# Patient Record
Sex: FEMALE | Race: ASIAN | NOT HISPANIC OR LATINO | Employment: UNEMPLOYED | URBAN - METROPOLITAN AREA
[De-identification: names, ages, dates, MRNs, and addresses within clinical notes are randomized per-mention and may not be internally consistent; named-entity substitution may affect disease eponyms.]

---

## 2017-02-13 ENCOUNTER — APPOINTMENT (OUTPATIENT)
Dept: RADIOLOGY | Facility: MEDICAL CENTER | Age: 27
End: 2017-02-13
Attending: EMERGENCY MEDICINE
Payer: COMMERCIAL

## 2017-02-13 ENCOUNTER — HOSPITAL ENCOUNTER (EMERGENCY)
Facility: MEDICAL CENTER | Age: 27
End: 2017-02-13
Attending: EMERGENCY MEDICINE
Payer: COMMERCIAL

## 2017-02-13 VITALS
TEMPERATURE: 98.9 F | DIASTOLIC BLOOD PRESSURE: 82 MMHG | SYSTOLIC BLOOD PRESSURE: 116 MMHG | BODY MASS INDEX: 18.86 KG/M2 | WEIGHT: 110.45 LBS | HEIGHT: 64 IN | OXYGEN SATURATION: 97 % | RESPIRATION RATE: 14 BRPM | HEART RATE: 80 BPM

## 2017-02-13 DIAGNOSIS — S93.504A: ICD-10-CM

## 2017-02-13 PROCEDURE — 73630 X-RAY EXAM OF FOOT: CPT | Mod: RT

## 2017-02-13 PROCEDURE — 700102 HCHG RX REV CODE 250 W/ 637 OVERRIDE(OP): Performed by: EMERGENCY MEDICINE

## 2017-02-13 PROCEDURE — A9270 NON-COVERED ITEM OR SERVICE: HCPCS | Performed by: EMERGENCY MEDICINE

## 2017-02-13 PROCEDURE — 99284 EMERGENCY DEPT VISIT MOD MDM: CPT

## 2017-02-13 RX ORDER — HYDROCODONE BITARTRATE AND ACETAMINOPHEN 5; 325 MG/1; MG/1
1-2 TABLET ORAL EVERY 4 HOURS PRN
Qty: 10 TAB | Refills: 0 | Status: SHIPPED | OUTPATIENT
Start: 2017-02-13

## 2017-02-13 RX ORDER — HYDROCODONE BITARTRATE AND ACETAMINOPHEN 5; 325 MG/1; MG/1
2 TABLET ORAL ONCE
Status: COMPLETED | OUTPATIENT
Start: 2017-02-13 | End: 2017-02-13

## 2017-02-13 RX ADMIN — HYDROCODONE BITARTRATE AND ACETAMINOPHEN 2 TABLET: 5; 325 TABLET ORAL at 12:57

## 2017-02-13 ASSESSMENT — LIFESTYLE VARIABLES
TOTAL SCORE: 0
TOTAL SCORE: 0
CONSUMPTION TOTAL: NEGATIVE
ON A TYPICAL DAY WHEN YOU DRINK ALCOHOL HOW MANY DRINKS DO YOU HAVE: 1
DO YOU DRINK ALCOHOL: YES
HAVE PEOPLE ANNOYED YOU BY CRITICIZING YOUR DRINKING: NO
EVER HAD A DRINK FIRST THING IN THE MORNING TO STEADY YOUR NERVES TO GET RID OF A HANGOVER: NO
HAVE YOU EVER FELT YOU SHOULD CUT DOWN ON YOUR DRINKING: NO
HOW MANY TIMES IN THE PAST YEAR HAVE YOU HAD 5 OR MORE DRINKS IN A DAY: 0
EVER FELT BAD OR GUILTY ABOUT YOUR DRINKING: NO
TOTAL SCORE: 0
AVERAGE NUMBER OF DAYS PER WEEK YOU HAVE A DRINK CONTAINING ALCOHOL: 1

## 2017-02-13 ASSESSMENT — PAIN SCALES - GENERAL
PAINLEVEL_OUTOF10: 2
PAINLEVEL_OUTOF10: 9

## 2017-02-13 NOTE — ED PROVIDER NOTES
"ED Provider Note    CHIEF COMPLAINT   Chief Complaint   Patient presents with   • Toe Injury     pt reports she broke her 4th digit on her R foot approx 2-3 days ago, \"something to do with the rain.  I think I fell really weird\".        HPI   Alondra Ramirez is a 26 y.o. female who presents for right 4th toe pain with bruising of the 3rd and 4th toes and the foot. Patient fell 2 days ago. Pain severe and she has difficulty walking. Denies pregnancy    REVIEW OF SYSTEMS   Pertinent positives: Small wound, bruising.  Pertinent negatives: Occipital leg pain.    PAST MEDICAL HISTORY   History reviewed. No pertinent past medical history.    CURRENT MEDICATIONS   Denies    ALLERGIES   No Known Allergies    PHYSICAL EXAM  VITAL SIGNS: /71 mmHg  Pulse 87  Temp(Src) 37.2 °C (98.9 °F) (Temporal)  Resp 18  Ht 1.626 m (5' 4\")  Wt 50.1 kg (110 lb 7.2 oz)  BMI 18.95 kg/m2  SpO2 96%  LMP 01/25/2017 (Approximate)  Constitutional: Well developed, Well nourished, borderline blood pressure elevation, well-appearing.  HENT: Atraumatic.   Cardiovascular: Foot warm.  Skin: Bruising of the 4th and 3rd toes of the right foot with ecchymoses extending onto the distal foot.   Musculoskeletal: Tenderness primarily of the 4th toe without deformity  Compartments: Soft foot  Neurologic: Digit flexion-extension and sensation intact    RADIOLOGY/PROCEDURES  DX-FOOT-COMPLETE 3+ RIGHT   Final Result      No evidence of acute fracture or dislocation.        Medications   hydrocodone-acetaminophen (NORCO) 5-325 MG per tablet 2 Tab (2 Tabs Oral Given 2/13/17 1257)     Improvement in pain    COURSE & MEDICAL DECISION MAKING  Well-appearing patient presents with a right 4th toe sprain without evidence of fracture or dislocation. Dislocation and spontaneous reduction is possible.    PLAN:  Postop shoe  Ibuprofen  Hydrocodone 5 mg #10  Prescription monitoring access    CONDITION: good    FINAL IMPRESSION  1. Sprain of toe, fourth, right, initial " encounter            Electronically signed by: Charlie Morse, 2/13/2017 12:45 PM

## 2017-02-13 NOTE — ED AVS SNAPSHOT
Home Care Instructions                                                                                                                Alondra Ramirez   MRN: [id number]    Department:  [facility] [place], Emergency Dept   Date of Visit:  2/13/2017            [facility], Emergency Dept    [address]    [place] [address]    Phone:  [phone]      You were seen by     Charlie Morse M.D.      Your Diagnosis Was     Sprain of toe, fourth, right, initial encounter     S93.504A       These are the medications you received during your hospitalization from 02/13/2017 1144 to 02/13/2017 1446     Date/Time Order Dose Route Action    02/13/2017 1257 hydrocodone-acetaminophen (NORCO) 5-325 MG per tablet 2 Tab 2 Tab Oral Given      Follow-up Information     1. Schedule an appointment as soon as possible for a visit with [facility].    Why:  As needed    Contact information    [address]  [place] [id number] [phone]        Medication Information     Review all of your home medications and newly ordered medications with your primary doctor and/or pharmacist as soon as possible. Follow medication instructions as directed by your doctor and/or pharmacist.     Please keep your complete medication list with you and share with your physician. Update the information when medications are discontinued, doses are changed, or new medications (including over-the-counter products) are added; and carry medication information at all times in the event of emergency situations.               Medication List      START taking these medications        Instructions    hydrocodone-acetaminophen 5-325 MG Tabs per tablet   Commonly known as:  NORCO    Take 1-2 Tabs by mouth every four hours as needed (mild pain).   Dose:  1-2 Tab               Procedures and tests performed during your visit     DX-FOOT-COMPLETE 3+ RIGHT        Discharge Instructions       Sprain  Ice the toe. Take ibuprofen 500 mg 4 times a  day and hydrocodone as needed for pain. Wear a stiff shoe.    A sprain is a tear in one of the strong, fibrous tissues that connect your bones (ligaments). The severity of the sprain depends on how much of the ligament is torn. The tear can be either partial or complete.  CAUSES   Often, sprains are a result of a fall or an injury. The force of the impact causes the fibers of your ligament to stretch beyond their normal length. This excess tension causes the fibers of your ligament to tear.  SYMPTOMS   You may have some loss of motion or increased pain within your normal range of motion. Other symptoms include:  · Bruising.  · Tenderness.  · Swelling.  DIAGNOSIS   In order to diagnose a sprain, your caregiver will physically examine you to determine how torn the ligament is. Your caregiver may also suggest an X-ray exam to make sure no bones are broken.  TREATMENT   If your ligament is only partially torn, treatment usually involves keeping the injured area in a fixed position (immobilization) for a short period. To do this, your caregiver will apply a bandage, cast, or splint to keep the area from moving until it heals. For a partially torn ligament, the healing process usually takes 2 to 3 weeks.  If your ligament is completely torn, you may need surgery to reconnect the ligament to the bone or to reconstruct the ligament. After surgery, a cast or splint may be applied and will need to stay on for 4 to 6 weeks while your ligament heals.  HOME CARE INSTRUCTIONS  · Keep the injured area elevated to decrease swelling.  · To ease pain and swelling, apply ice to your joint twice a day, for 2 to 3 days.  · Put ice in a plastic bag.  · Place a towel between your skin and the bag.  · Leave the ice on for 15 minutes.  · Only take over-the-counter or prescription medicine for pain as directed by your caregiver.  · Do not leave the injured area unprotected until pain and stiffness go away (usually 3 to 4 weeks).  · Do not  allow your cast or splint to get wet. Cover your cast or splint with a plastic bag when you shower or bathe. Do not swim.  · Your caregiver may suggest exercises for you to do during your recovery to prevent or limit permanent stiffness.  SEEK IMMEDIATE MEDICAL CARE IF:  · Your cast or splint becomes damaged.  · Your pain becomes worse.  MAKE SURE YOU:  · Understand these instructions.  · Will watch your condition.  · Will get help right away if you are not doing well or get worse.  Document Released: 12/15/2001 Document Revised: 03/11/2013 Document Reviewed: 12/29/2012  [employer]Care® Patient Information ©2014 [employer].            Patient Information     Patient Information    Following emergency treatment: all patient requiring follow-up care must return either to a private physician or a clinic if your condition worsens before you are able to obtain further medical attention, please return to the emergency room.     Billing Information    At [facility], we work to make the billing process streamlined for our patients.  Our Representatives are here to answer any questions you may have regarding your hospital bill.  If you have insurance coverage and have supplied your insurance information to us, we will submit a claim to your insurer on your behalf.  Should you have any questions regarding your bill, we can be reached online or by phone as follows:  Online: You are able pay your bills online or live chat with our representatives about any billing questions you may have. We are here to help Monday - Friday from 8:00am to 7:30pm and 9:00am - 12:00pm on Saturdays.  Please visit [email or web address]  for more information.   Phone:  [phone] or [phone]    Please note that your emergency physician, surgeon, pathologist, radiologist, anesthesiologist, and other specialists are not employed by [facility] and will therefore bill separately for their services.  Please contact them  directly for any questions concerning their bills at the numbers below:     Emergency Physician Services:  [phone]  [facility]:  [phone]  [facility]:  [phone]  [facility]:  [phone]    1. Your final bill may vary from the amount quoted upon discharge if all procedures are not complete at that time, or if your doctor has additional procedures of which we are not aware. You will receive an additional bill if you return to the Emergency Department at [facility] for suture removal regardless of the facility of which the sutures were placed.     2. Please arrange for settlement of this account at the emergency registration.    3. All self-pay accounts are due in full at the time of treatment.  If you are unable to meet this obligation then payment is expected within 4-5 days.     4. If you have had radiology studies (CT, X-ray, Ultrasound, MRI), you have received a preliminary result during your emergency department visit. Please contact the radiology department [phone] to receive a copy of your final result. Please discuss the Final result with your primary physician or with the follow up physician provided.     Crisis Hotline:  [place] Crisis Hotline:  2-357-ISUJLZB or 1-465.139.2034  [place] Crisis Hotline:    1-756.429.7263 or 112-889-6053         ED Discharge Follow Up Questions    1. In order to provide you with very good care, we would like to follow up with a phone call in the next few days.  May we have your permission to contact you?     YES /  NO    2. What is the best phone number to call you? (       )_____-__________    3. What is the best time to call you?      Morning  /  Afternoon  /  Evening                   Patient Signature:  ____________________________________________________________    Date:  ____________________________________________________________

## 2017-02-13 NOTE — ED AVS SNAPSHOT
besomebody. Access Code: [email or web address]  Expires: 3/15/2017  2:46 PM    Your email address is not on file at [employer]  Email Addresses are required for you to sign up for besomebody., please contact [phone] to verify your personal information and to provide your email address prior to attempting to register for besomebody..    [place] [address]    [employer]  A secure, online tool to manage your health information     [employer]s besomebody.® is a secure, online tool that connects you to your personalized health information from the privacy of your home -- day or night - making it very easy for you to manage your healthcare. Once the activation process is completed, you can even access your medical information using the besomebody. galo, which is available for free in the Apple Galo store or Google Play store.     To learn more about besomebody., visit [email or web address].    There are two levels of access available (as shown below):   My Chart Features  [facility] Primary Care Doctor [facility]  Specialists [facility]  Urgent  Care Non-[facility] Primary Care Doctor   Email your healthcare team securely and privately 24/7 X X X    Manage appointments: schedule your next appointment; view details of past/upcoming appointments X      Request prescription refills. X      View recent personal medical records, including lab and immunizations X X X X   View health record, including health history, allergies, medications X X X X   Read reports about your outpatient visits, procedures, consult and ER notes X X X X   See your discharge summary, which is a recap of your hospital and/or ER visit that includes your diagnosis, lab results, and care plan X X  X     How to register for besomebody.:  Once your e-mail address has been verified, follow the following steps to sign up for besomebody..     1. Go to  [email or web address]  2. Click on the Sign Up Now box, which takes you to the New Member Sign Up page. You will need to  provide the following information:  a. Enter your [employer] Access Code exactly as it appears at the top of this page. (You will not need to use this code after you’ve completed the sign-up process. If you do not sign up before the expiration date, you must request a new code.)   b. Enter your date of birth.   c. Enter your home email address.   d. Click Submit, and follow the next screen’s instructions.  3. Create a Yapta ID. This will be your Yapta login ID and cannot be changed, so think of one that is secure and easy to remember.  4. Create a Yapta password. You can change your password at any time.  5. Enter your Password Reset Question and Answer. This can be used at a later time if you forget your password.   6. Enter your e-mail address. This allows you to receive e-mail notifications when new information is available in Yapta.  7. Click Sign Up. You can now view your health information.    For assistance activating your [employer] account, call [phone]

## 2017-02-13 NOTE — ED NOTES
Pt given d/c instructions/home care instructions, pt verbalized understanding of POC, pt ambulated to ER lobby, steady gait.

## 2017-02-13 NOTE — ED AVS SNAPSHOT
2/13/2017          Alondra Ramirez  [address] [place] [address]    Dear Alondra:    [facility] wants to ensure your discharge home is safe and you or your loved ones have had all your questions answered regarding your care after you leave the hospital.    You may receive a telephone call within two days of your discharge.  This call is to make certain you understand your discharge instructions as well as ensure we provided you with the best care possible during your stay with us.     The call will only last approximately 3-5 minutes and will be done by a nurse.    Once again, we want to ensure your discharge home is safe and that you have a clear understanding of any next steps in your care.  If you have any questions or concerns, please do not hesitate to contact us, we are here for you.  Thank you for choosing [facility] for your healthcare needs.    Sincerely,    Abdiel Mari    [facility]

## 2017-02-13 NOTE — DISCHARGE INSTRUCTIONS
Sprain  Ice the toe. Take ibuprofen 500 mg 4 times a day and hydrocodone as needed for pain. Wear a stiff shoe.    A sprain is a tear in one of the strong, fibrous tissues that connect your bones (ligaments). The severity of the sprain depends on how much of the ligament is torn. The tear can be either partial or complete.  CAUSES   Often, sprains are a result of a fall or an injury. The force of the impact causes the fibers of your ligament to stretch beyond their normal length. This excess tension causes the fibers of your ligament to tear.  SYMPTOMS   You may have some loss of motion or increased pain within your normal range of motion. Other symptoms include:  · Bruising.  · Tenderness.  · Swelling.  DIAGNOSIS   In order to diagnose a sprain, your caregiver will physically examine you to determine how torn the ligament is. Your caregiver may also suggest an X-ray exam to make sure no bones are broken.  TREATMENT   If your ligament is only partially torn, treatment usually involves keeping the injured area in a fixed position (immobilization) for a short period. To do this, your caregiver will apply a bandage, cast, or splint to keep the area from moving until it heals. For a partially torn ligament, the healing process usually takes 2 to 3 weeks.  If your ligament is completely torn, you may need surgery to reconnect the ligament to the bone or to reconstruct the ligament. After surgery, a cast or splint may be applied and will need to stay on for 4 to 6 weeks while your ligament heals.  HOME CARE INSTRUCTIONS  · Keep the injured area elevated to decrease swelling.  · To ease pain and swelling, apply ice to your joint twice a day, for 2 to 3 days.  · Put ice in a plastic bag.  · Place a towel between your skin and the bag.  · Leave the ice on for 15 minutes.  · Only take over-the-counter or prescription medicine for pain as directed by your caregiver.  · Do not leave the injured area unprotected until pain and  stiffness go away (usually 3 to 4 weeks).  · Do not allow your cast or splint to get wet. Cover your cast or splint with a plastic bag when you shower or bathe. Do not swim.  · Your caregiver may suggest exercises for you to do during your recovery to prevent or limit permanent stiffness.  SEEK IMMEDIATE MEDICAL CARE IF:  · Your cast or splint becomes damaged.  · Your pain becomes worse.  MAKE SURE YOU:  · Understand these instructions.  · Will watch your condition.  · Will get help right away if you are not doing well or get worse.  Document Released: 12/15/2001 Document Revised: 03/11/2013 Document Reviewed: 12/29/2012  [employer]® Patient Information ©2014 [employer], [employer].

## 2017-03-15 ENCOUNTER — HOSPITAL ENCOUNTER (EMERGENCY)
Facility: MEDICAL CENTER | Age: 27
End: 2017-03-15
Payer: COMMERCIAL

## 2017-03-15 VITALS
TEMPERATURE: 99.3 F | HEIGHT: 64 IN | OXYGEN SATURATION: 100 % | DIASTOLIC BLOOD PRESSURE: 74 MMHG | RESPIRATION RATE: 16 BRPM | HEART RATE: 95 BPM | SYSTOLIC BLOOD PRESSURE: 114 MMHG

## 2017-03-15 PROCEDURE — 302449 STATCHG TRIAGE ONLY (STATISTIC)

## 2017-03-15 ASSESSMENT — PAIN SCALES - GENERAL: PAINLEVEL_OUTOF10: 10

## 2017-09-18 ENCOUNTER — HOSPITAL ENCOUNTER (OUTPATIENT)
Dept: HOSPITAL 8 - ED | Age: 27
Setting detail: OBSERVATION
Discharge: HOME | End: 2017-09-18
Attending: OBSTETRICS & GYNECOLOGY | Admitting: OBSTETRICS & GYNECOLOGY
Payer: COMMERCIAL

## 2017-09-18 VITALS — DIASTOLIC BLOOD PRESSURE: 82 MMHG | SYSTOLIC BLOOD PRESSURE: 123 MMHG

## 2017-09-18 VITALS — HEIGHT: 65 IN | WEIGHT: 110.23 LBS | BODY MASS INDEX: 18.37 KG/M2

## 2017-09-18 DIAGNOSIS — O00.10: Primary | ICD-10-CM

## 2017-09-18 DIAGNOSIS — F12.90: ICD-10-CM

## 2017-09-18 PROCEDURE — 99285 EMERGENCY DEPT VISIT HI MDM: CPT

## 2017-09-18 PROCEDURE — 86900 BLOOD TYPING SEROLOGIC ABO: CPT

## 2017-09-18 PROCEDURE — 86850 RBC ANTIBODY SCREEN: CPT

## 2017-09-18 PROCEDURE — G0378 HOSPITAL OBSERVATION PER HR: HCPCS

## 2017-09-18 PROCEDURE — 59151 TREAT ECTOPIC PREGNANCY: CPT

## 2017-09-18 PROCEDURE — 36415 COLL VENOUS BLD VENIPUNCTURE: CPT

## 2017-09-18 PROCEDURE — 88305 TISSUE EXAM BY PATHOLOGIST: CPT

## 2017-09-18 RX ADMIN — FENTANYL CITRATE PRN MCG: 50 INJECTION INTRAMUSCULAR; INTRAVENOUS at 21:23

## 2017-09-18 RX ADMIN — FENTANYL CITRATE PRN MCG: 50 INJECTION INTRAMUSCULAR; INTRAVENOUS at 21:05

## 2018-02-07 ENCOUNTER — HOSPITAL ENCOUNTER (EMERGENCY)
Dept: HOSPITAL 8 - ED | Age: 28
Discharge: HOME | End: 2018-02-07
Payer: COMMERCIAL

## 2018-02-07 VITALS — WEIGHT: 119.49 LBS | BODY MASS INDEX: 19.91 KG/M2 | HEIGHT: 65 IN

## 2018-02-07 VITALS — DIASTOLIC BLOOD PRESSURE: 97 MMHG | SYSTOLIC BLOOD PRESSURE: 130 MMHG

## 2018-02-07 DIAGNOSIS — O23.11: Primary | ICD-10-CM

## 2018-02-07 DIAGNOSIS — O26.891: ICD-10-CM

## 2018-02-07 DIAGNOSIS — Z3A.11: ICD-10-CM

## 2018-02-07 LAB
ALBUMIN SERPL-MCNC: 3.9 G/DL (ref 3.4–5)
ALP SERPL-CCNC: 54 U/L (ref 45–117)
ALT SERPL-CCNC: 18 U/L (ref 12–78)
ANION GAP SERPL CALC-SCNC: 11 MMOL/L (ref 5–15)
BASOPHILS # BLD AUTO: 0.02 X10^3/UL (ref 0–0.1)
BASOPHILS NFR BLD AUTO: 0 % (ref 0–1)
BILIRUB SERPL-MCNC: 0.5 MG/DL (ref 0.2–1)
CALCIUM SERPL-MCNC: 9.2 MG/DL (ref 8.5–10.1)
CHLORIDE SERPL-SCNC: 105 MMOL/L (ref 98–107)
CREAT SERPL-MCNC: 0.67 MG/DL (ref 0.55–1.02)
CULTURE INDICATED?: YES
EOSINOPHIL # BLD AUTO: 0.08 X10^3/UL (ref 0–0.4)
EOSINOPHIL NFR BLD AUTO: 1 % (ref 1–7)
ERYTHROCYTE [DISTWIDTH] IN BLOOD BY AUTOMATED COUNT: 13.7 % (ref 9.6–15.2)
LYMPHOCYTES # BLD AUTO: 2.62 X10^3/UL (ref 1–3.4)
LYMPHOCYTES NFR BLD AUTO: 21 % (ref 22–44)
MCH RBC QN AUTO: 29.6 PG (ref 27–34.8)
MCHC RBC AUTO-ENTMCNC: 33.9 G/DL (ref 32.4–35.8)
MCV RBC AUTO: 87.3 FL (ref 80–100)
MD: NO
MICROSCOPIC: (no result)
MONOCYTES # BLD AUTO: 0.67 X10^3/UL (ref 0.2–0.8)
MONOCYTES NFR BLD AUTO: 5 % (ref 2–9)
NEUTROPHILS # BLD AUTO: 8.96 X10^3/UL (ref 1.8–6.8)
NEUTROPHILS NFR BLD AUTO: 73 % (ref 42–75)
PLATELET # BLD AUTO: 317 X10^3/UL (ref 130–400)
PMV BLD AUTO: 6.6 FL (ref 7.4–10.4)
PROT SERPL-MCNC: 9 G/DL (ref 6.4–8.2)
RBC # BLD AUTO: 5.4 X10^6/UL (ref 3.82–5.3)

## 2018-02-07 PROCEDURE — 80053 COMPREHEN METABOLIC PANEL: CPT

## 2018-02-07 PROCEDURE — 84702 CHORIONIC GONADOTROPIN TEST: CPT

## 2018-02-07 PROCEDURE — 36415 COLL VENOUS BLD VENIPUNCTURE: CPT

## 2018-02-07 PROCEDURE — 81001 URINALYSIS AUTO W/SCOPE: CPT

## 2018-02-07 PROCEDURE — 87147 CULTURE TYPE IMMUNOLOGIC: CPT

## 2018-02-07 PROCEDURE — 87086 URINE CULTURE/COLONY COUNT: CPT

## 2018-02-07 PROCEDURE — 99285 EMERGENCY DEPT VISIT HI MDM: CPT

## 2018-02-07 PROCEDURE — 85025 COMPLETE CBC W/AUTO DIFF WBC: CPT

## 2018-02-07 PROCEDURE — 76801 OB US < 14 WKS SINGLE FETUS: CPT
